# Patient Record
Sex: MALE | Race: WHITE | NOT HISPANIC OR LATINO | URBAN - METROPOLITAN AREA
[De-identification: names, ages, dates, MRNs, and addresses within clinical notes are randomized per-mention and may not be internally consistent; named-entity substitution may affect disease eponyms.]

---
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
## 2022-02-12 VITALS
HEIGHT: 71 IN | DIASTOLIC BLOOD PRESSURE: 70 MMHG | WEIGHT: 192 LBS | WEIGHT: 192 LBS | TEMPERATURE: 97.5 F | HEART RATE: 76 BPM | BODY MASS INDEX: 26.88 KG/M2 | HEIGHT: 71 IN | BODY MASS INDEX: 26.88 KG/M2 | SYSTOLIC BLOOD PRESSURE: 128 MMHG | TEMPERATURE: 97.6 F

## 2022-02-12 VITALS
HEIGHT: 70 IN | WEIGHT: 179 LBS | DIASTOLIC BLOOD PRESSURE: 68 MMHG | SYSTOLIC BLOOD PRESSURE: 118 MMHG | HEART RATE: 76 BPM | TEMPERATURE: 98.2 F | BODY MASS INDEX: 25.62 KG/M2

## 2022-02-12 VITALS
HEART RATE: 76 BPM | WEIGHT: 194.2 LBS | SYSTOLIC BLOOD PRESSURE: 120 MMHG | TEMPERATURE: 97.3 F | DIASTOLIC BLOOD PRESSURE: 80 MMHG

## 2022-02-12 VITALS
HEART RATE: 80 BPM | HEIGHT: 72 IN | SYSTOLIC BLOOD PRESSURE: 132 MMHG | WEIGHT: 225 LBS | BODY MASS INDEX: 30.48 KG/M2 | DIASTOLIC BLOOD PRESSURE: 78 MMHG | TEMPERATURE: 97.8 F

## 2022-02-12 VITALS
BODY MASS INDEX: 29.84 KG/M2 | HEART RATE: 62 BPM | SYSTOLIC BLOOD PRESSURE: 122 MMHG | WEIGHT: 208.4 LBS | HEIGHT: 70 IN | DIASTOLIC BLOOD PRESSURE: 64 MMHG

## 2022-02-12 VITALS
SYSTOLIC BLOOD PRESSURE: 128 MMHG | HEART RATE: 78 BPM | BODY MASS INDEX: 31.94 KG/M2 | TEMPERATURE: 96.5 F | HEIGHT: 72 IN | DIASTOLIC BLOOD PRESSURE: 57 MMHG | WEIGHT: 235.8 LBS

## 2022-02-15 NOTE — NURSING NOTE
Comprehensive Intake Entered On:  12/28/2021 2:06 PM CST    Performed On:  12/28/2021 2:01 PM CST by Josiane COTA, Rowan               Summary   Chief Complaint :   ADHD f/u, refill meds   Weight Measured :   235.8 lb(Converted to: 235 lb 13 oz, 106.957 kg)    Height Measured :   72 in(Converted to: 6 ft 0 in, 182.88 cm)    Body Mass Index :   31.98 kg/m2   Body Surface Area :   2.33 m2   Height/Length Estimated :   69.5 in(Converted to: 5 ft 9 in, 176.53 cm)    Systolic Blood Pressure :   128 mmHg   Diastolic Blood Pressure :   57 mmHg (LOW)    Mean Arterial Pressure :   81 mmHg   Peripheral Pulse Rate :   78 bpm   BP Site :   Right arm   Pulse Site :   Brachial artery   BP Method :   Electronic   HR Method :   Electronic   Temperature Tympanic :   96.5 DegF(Converted to: 35.8 DegC)  (LOW)    Rowan Joshua MA - 12/28/2021 2:01 PM CST   Health Status   Allergies Verified? :   Yes   Medication History Verified? :   Yes   Medical History Verified? :   Yes   Pre-Visit Planning Status :   Completed   Tobacco Use? :   Never smoker   Rowan Joshua MA - 12/28/2021 2:01 PM CST   Consents   Consent for Immunization Exchange :   Consent Granted   Consent for Immunizations to Providers :   Consent Granted   Rowan Joshua MA - 12/28/2021 2:01 PM CST   Meds / Allergies   (As Of: 12/28/2021 2:06:52 PM CST)   Allergies (Active)   No Known Medication Allergies  Estimated Onset Date:   Unspecified ; Created By:   HILL Manzano CMA, Jessica; Reaction Status:   Active ; Category:   Drug ; Substance:   No Known Medication Allergies ; Type:   Allergy ; Updated By:   HILL Manzano CMA, Jessica; Reviewed Date:   10/11/2021 11:35 AM CDT        Medication List   (As Of: 12/28/2021 2:06:52 PM CST)   Prescription/Discharge Order    methylphenidate  :   methylphenidate ; Status:   Prescribed ; Ordered As Mnemonic:   methylphenidate 10 mg oral tablet ; Simple Display Line:   10 mg, 1 tab(s), Oral, tid, for 30 day(s), 90 tab(s), 0 Refill(s) ;  Ordering Provider:   Camilo Singh MD; Catalog Code:   methylphenidate ; Order Dt/Tm:   10/11/2021 12:07:37 PM CDT            Social History   Social History   (As Of: 12/28/2021 2:06:52 PM CST)   Alcohol:        Never   (Last Updated: 4/7/2015 10:31:21 AM CDT by Aníbal Floyd CMA)          Tobacco:        Never (less than 100 in lifetime)   (Last Updated: 10/11/2021 11:34:52 AM CDT by Yue Patel LPN)          Electronic Cigarette/Vaping:        Electronic Cigarette Use: Never.   (Last Updated: 10/11/2021 11:34:55 AM CDT by Yue Patel LPN)          Substance Abuse:        Never, Household substance abuse concerns: No.  Use of drugs by peers: No.   (Last Updated: 4/2/2015 9:09:01 AM CDT by Aníbal Floyd CMA)          Home/Environment:        Lives with Mother, 2 younger brothers.  1 younger sister.  1 other adult relative..  Alcohol abuse in household: No.  Substance abuse in household: No.  Smoker in household: No.  Risks in environment: Firearm in the home; unknown if locked..   (Last Updated: 11/30/2016 7:29:14 AM CST by Charlee Lopez)

## 2022-02-15 NOTE — NURSING NOTE
Comprehensive Intake Entered On:  3/22/2019 11:36 AM CDT    Performed On:  3/22/2019 11:34 AM CDT by Sheyla Robles MA               Summary   Chief Complaint :   Ear consult-Right ear TM perforation found on physical exam, no pain, no drainage, referred by Tayo Musa   Weight Measured :   192 lb(Converted to: 192 lb 0 oz, 87.09 kg)    Height Measured :   71 in(Converted to: 5 ft 11 in, 180.34 cm)    Body Mass Index :   26.78 kg/m2   Body Surface Area :   2.09 m2   Temperature Tympanic :   97.6 DegF(Converted to: 36.4 DegC)  (LOW)    Sheyla Robles MA - 3/22/2019 11:34 AM CDT   Health Status   Allergies Verified? :   Yes   Medication History Verified? :   Yes   Medical History Verified? :   No   Pre-Visit Planning Status :   Completed   Tobacco Use? :   Never smoker   Sheyla Robles MA - 3/22/2019 11:34 AM CDT   Consents   Consent for Immunization Exchange :   Consent Granted   Consent for Immunizations to Providers :   Consent Granted   Sheyla Robles MA - 3/22/2019 11:34 AM CDT   Meds / Allergies   (As Of: 3/22/2019 11:36:58 AM CDT)   Allergies (Active)   No Known Medication Allergies  Estimated Onset Date:   Unspecified ; Created By:   HILL Manzano CMA, Jessica; Reaction Status:   Active ; Category:   Drug ; Substance:   No Known Medication Allergies ; Type:   Allergy ; Updated By:   HILL Manzano CMA, Jessica; Reviewed Date:   3/7/2019 4:15 PM CST        Medication List   (As Of: 3/22/2019 11:36:59 AM CDT)   No Known Home Medications     Sheyla Robles MA - 3/22/2019 11:35:08 AM

## 2022-02-15 NOTE — NURSING NOTE
Comprehensive Intake Entered On:  10/11/2021 11:37 AM CDT    Performed On:  10/11/2021 11:31 AM CDT by Yue Patel LPN               Summary   Chief Complaint :   having trouble concentrating in school, would like to discuss treatment for anxiety and ADHD   Weight Measured :   225 lb(Converted to: 225 lb 0 oz, 102.058 kg)    Height Measured :   72 in(Converted to: 6 ft 0 in, 182.88 cm)    Body Mass Index :   30.51 kg/m2   Body Surface Area :   2.27 m2   Height/Length Estimated :   69.5 in(Converted to: 5 ft 9 in, 176.53 cm)    Systolic Blood Pressure :   132 mmHg (HI)    Diastolic Blood Pressure :   78 mmHg   Mean Arterial Pressure :   96 mmHg   Peripheral Pulse Rate :   80 bpm   BP Site :   Right arm   Pulse Site :   Radial artery   BP Method :   Manual   HR Method :   Manual   Temperature Tympanic :   97.8 DegF(Converted to: 36.6 DegC)  (LOW)    Yue Patel LPN - 10/11/2021 11:31 AM CDT   Health Status   Allergies Verified? :   Yes   Medication History Verified? :   Yes   Pre-Visit Planning Status :   Completed   Tobacco Use? :   Never smoker   Yue Patel LPN - 10/11/2021 11:31 AM CDT   Consents   Consent for Immunization Exchange :   Consent Granted   Consent for Immunizations to Providers :   Consent Granted   Yue Patel LPN - 10/11/2021 11:31 AM CDT   Meds / Allergies   (As Of: 10/11/2021 11:37:08 AM CDT)   Allergies (Active)   No Known Medication Allergies  Estimated Onset Date:   Unspecified ; Created By:   HILL Manzano CMA, Jessica; Reaction Status:   Active ; Category:   Drug ; Substance:   No Known Medication Allergies ; Type:   Allergy ; Updated By:   HILL Manzano CMA, Jessica; Reviewed Date:   10/11/2021 11:35 AM CDT        Medication List   (As Of: 10/11/2021 11:37:08 AM CDT)        Social History   Social History   (As Of: 10/11/2021 11:37:08 AM CDT)   Alcohol:        Never   (Last Updated: 4/7/2015 10:31:21 AM CDT by Aníbal Floyd CMA)          Tobacco:        Never (less  than 100 in lifetime)   (Last Updated: 10/11/2021 11:34:52 AM CDT by Yue Patel LPN)          Electronic Cigarette/Vaping:        Electronic Cigarette Use: Never.   (Last Updated: 10/11/2021 11:34:55 AM CDT by Yue Patel LPN)          Substance Abuse:        Never, Household substance abuse concerns: No.  Use of drugs by peers: No.   (Last Updated: 4/2/2015 9:09:01 AM CDT by Aníbal Floyd CMA)          Home/Environment:        Lives with Mother, 2 younger brothers.  1 younger sister.  1 other adult relative..  Alcohol abuse in household: No.  Substance abuse in household: No.  Smoker in household: No.  Risks in environment: Firearm in the home; unknown if locked..   (Last Updated: 11/30/2016 7:29:14 AM CST by Charlee Lopez)

## 2022-02-15 NOTE — PROGRESS NOTES
Chief Complaint    ADHD f/u, refill meds  History of Present Illness      Patent presents for attention deficit disorder follow up.  There have been no problems with the medication. The current dose is controlling symptoms. There are no other concerns.      He did much better in school last semester.      He will be transferring to [employer] for the fall semester 2022  Review of Systems          ROS reviewed and negative except for symptoms noted in HPI.  Physical Exam   Vitals & Measurements    T: 96.5  F (Tympanic)  HR: 78 (Peripheral)  BP: 128/57     HT: 72 in  HT: 69.5 in  WT: 235.8 lb  BMI: 31.98             General:  Alert and oriented, No acute distress.             Eye: Normal conjunctiva.             HENT:  Oral mucosa is moist.             Neck:  Supple.             Respiratory:  Respirations are non-labored.             Cardiovascular:  Normal rate           Musculoskeletal:  Normal gait.             Psychiatric:  Cooperative, Appropriate mood & affect, Normal judgment.  Assessment/Plan       1. ADD (attention deficit disorder) (F98.8)         Doing well, continue current medication        follow up in 4 months        He will send a message when ready for refill                Orders:         methylphenidate, = 1 tab(s) ( 10 mg ), Oral, bid, # 60 tab(s), 0 Refill(s), Type: Maintenance, Pharmacy: [facility] #[id number], 1 tab(s) Oral bid,x30 day(s), 72, in, 12/28/21 14:01:00 CST, Height Measured, 235.8, lb, 12/28/21 14:01:00 CST, Weight Measured, (Ordered)         methylphenidate, = 1 tab(s) ( 10 mg ), Oral, tid, x 30 day(s), # 90 tab(s), 0 Refill(s), Type: Hard Stop, Pharmacy: [facility] #[id number], 72, in, 10/11/21 11:31:00 CDT, Height Measured, 225, lb, 10/11/21 11:31:00 CDT, Weight Measured, (Completed)  Patient Information     Name:BRUNNER, JAMES A      Address:      [address] [place] [phone]     Sex:Male     YOB: 2000     [phone]     Emergency  Contact:BRUNNER, LIJANE H     MRN:[id number]     FIN:[id number]     Location:[facility]     Date of Service:12/28/2021      Primary Care Physician:       NONE ,       Attending Physician:       Camilo Singh MD, [phone]  Problem List/Past Medical History    Ongoing     Obesity     Warts       Comments: Left hand. Left foot. Treated. See outside records.    Historical     No qualifying data  Procedure/Surgical History     None  Medications    methylphenidate 10 mg oral tablet, 10 mg= 1 tab(s), Oral, bid  Allergies    No Known Medication Allergies  Social History    Smoking Status     Never smoker     Alcohol      Never     Electronic Cigarette/Vaping      Electronic Cigarette Use: Never.     Home/Environment      Lives with Mother, 2 younger brothers. 1 younger sister. 1 other adult relative.. Alcohol abuse in household: No. Substance abuse in household: No. Smoker in household: No. Risks in environment: Firearm in the home; unknown if locked..     Substance Abuse      Never, Household substance abuse concerns: No. Use of drugs by peers: No.     Tobacco      Never (less than 100 in lifetime)  Family History    Alcohol abuse: Grandfather (M) and Grandfather (P).    Asthma: Father.    Diabetes mellitus: Grandfather (M).    Graves' disease: Mother.    Heart attack: Grandfather (P).    Heart disease: Grandfather (P).    Hypertension: Grandfather (P).    Tobacco abuse: Grandfather (M) and Grandfather (P).    Sister: History is unknown    Brother: History is unknown    Brother: History is unknown  Immunizations       Scheduled Immunizations       Dose Date(s)       DTaP       2000, 2002, 2002, 07/16/2003, 05/17/2007       Hep A, pediatric/adolescent       05/17/2007, 06/26/2008       Hep B       08/18/2011, 10/26/2011       hepatitis B pediatric vaccine       2002, 2002, 2002       Hib (HbOC)       2002, 2002, 03/16/2003, 2002       human papillomavirus  vaccine       02/02/2016, 04/08/2016, 08/15/2016       influenza       10/14/2014, 10/26/2011       influenza virus vaccine, inactivated       10/29/2015, 10/16/2018       IPV       2002, 2002, 08/16/2003, 03/17/2007       meningococcal conjugate vaccine       06/19/2014       MMR (measles/mumps/rubella)       04/09/2003, 01/15/2003, 06/13/2003, 05/17/2007       pneumococcal (PCV7)       05/01/2003, 09/16/2003, 10/01/2003, 12/02/2003       SARS-CoV-2 (COVID-19) Aubrey Ad26 vacc       05/07/2021       tetanus/diphth/pertuss (Tdap) adult/adol       06/19/2014       varicella       03/17/2007, 08/22/2007       Other Immunizations               influenza virus vaccine, inactivated       10/11/2021

## 2022-02-15 NOTE — PROGRESS NOTES
Patient:   BRUNNER, JAMES A            MRN: [id number]            FIN: [id number]               Age:   18 years     Sex:  Male     :  2000   Associated Diagnoses:   Sports physical; Tympanic membrane perforation; Well child check   Author:   Jazmine Witt      Visit Information      Date of Service: 2019 03:29 pm  Performing Location: [facility]  Encounter#: [id number]      Primary Care Provider (PCP):  Jazmine Witt    NPI# [id number]      Chief Complaint   3/7/2019 3:35 PM CST     Sports px - tennis.        Well Adult History   PPC for sports physical, will be playing tennis,   knows how to swim, rides bike with helmet  no guns in house,  knows how to drive and does not text and drive  is dating now but denies sexual activity  no hx of smoking, asthma, recent international travel  no illegal drug use  sees eye doctor annually and dentist at least once a year      Review of Systems   Constitutional:  Negative.    Eye:  Negative.    Ear/Nose/Mouth/Throat:  Negative.    Respiratory:  Negative.    Cardiovascular:  Negative, reports 'innocent murmur' as child.    Gastrointestinal:  Negative.    Genitourinary:  Negative.    Hematology/Lymphatics:  Negative.    Endocrine:  Negative.    Immunologic:  Negative.    Musculoskeletal:  Negative, no marfan's hx.    Integumentary:  Negative.    Neurologic:  Negative, no concussion hx.    Psychiatric:  Negative.       Health Status   Allergies:    Allergic Reactions (Selected)  No Known Medication Allergies   Problem list:    All Problems (Selected)  Obesity / SNOMED CT [id number] / Probable      Histories   Family History:    Graves' disease  Mother (LiJane)  Diabetes mellitus  Grandfather (M)  Hypertension  Grandfather (P)  Heart disease  Grandfather (P)  Alcohol abuse  Grandfather (P)  Grandfather (M)  Heart attack  Grandfather (P)  Tobacco abuse  Grandfather (M)  Grandfather (P)  Asthma  Father     Procedure history:    None  ([id number]).   Social History:        Alcohol Assessment            Never      Tobacco Assessment            Never      Substance Abuse Assessment            Never, Household substance abuse concerns: No.  Use of drugs by peers: No.      Home and Environment Assessment            Lives with Mother, 2 younger brothers.  1 younger sister.  1 other adult relative..  Alcohol abuse in               household: No.  Substance abuse in household: No.  Smoker in household: No.  Risks in environment: Firearm in               the home; unknown if locked..      Physical Examination   Vital Signs   3/7/2019 3:35 PM CST Temperature Tympanic 97.5 DegF  LOW    Peripheral Pulse Rate 76 bpm    Pulse Site Radial artery    HR Method Manual    Systolic Blood Pressure 128 mmHg    Diastolic Blood Pressure 70 mmHg    Mean Arterial Pressure 89 mmHg    BP Site Right arm    BP Method Manual      Measurements from flowsheet : Measurements   3/7/2019 3:35 PM CST Height Measured - Standard 71 in    Weight Measured - Standard 192 lb    BSA 2.09 m2    Body Mass Index 26.78 kg/m2    Body Mass Index Percentile 92.40      General:  Alert and oriented, No acute distress.    Eye:  Pupils are equal, round and reactive to light, Intact accommodation, Normal conjunctiva, Vision unchanged.         Periorbital area: Within normal limits.    HENT:  Normocephalic, Normal hearing, Oral mucosa is moist, No pharyngeal erythema, No sinus tenderness, left TM normal  right TM perforation, .    Neck:  Supple, Non-tender, No lymphadenopathy, No thyromegaly.    Respiratory:  Lungs are clear to auscultation, Respirations are non-labored, No chest wall tenderness.    Cardiovascular:  Normal rate, Regular rhythm, No murmur, No edema.    Gastrointestinal:  Soft, Non-tender, Non-distended, Normal bowel sounds, No organomegaly.    Genitourinary:  No costovertebral angle tenderness, Normal genitalia for age and sex, No scrotal tenderness, No inguinal tenderness.     Lymphatics:  No lymphadenopathy neck, axilla, groin.    Musculoskeletal:  Normal range of motion, Normal strength, No swelling.    Integumentary:  Warm, Dry, Pink.    Neurologic:  Alert, Oriented, Normal sensory, Normal motor function.    Psychiatric:  Cooperative, Appropriate mood & affect, Normal judgment.       Impression and Plan   Diagnosis     Sports physical (EZW41-IJ Z02.5).     Tympanic membrane perforation (UWF73-JZ H72.90).     Well child check (BEB81-YK Z00.129).     Plan:  called mother with found perforation on exam, no ear pain, no discharge  will refer to ENT for evaluation    cleared for tennis.

## 2022-02-15 NOTE — PROGRESS NOTES
Patient:   BRUNNER, JAMES A            MRN: [id number]            FIN: [id number]               Age:   18 years     Sex:  Male     :  2000   Associated Diagnoses:   Pinworms   Author:   Bradly Stoner MD      Chief Complaint   10/15/2018 7:28 PM CDT   recurring pinworms. update flu shot      History of Present Illness   see chief complaint as noted above and confirmed with the patient   18 year old male here with his mom presenting with reaccurring pinworms. He has been taking OTC pin worm treatment but hasn't cleared them up. He has noticed he has small white worms in his stool. He does get some rectal itching at night.      Review of Systems   Constitutional:  No fever, No chills.    Ear/Nose/Mouth/Throat:  Negative.    Respiratory:  No shortness of breath.    Cardiovascular:  No chest pain.    Gastrointestinal:  white worms in stool, No nausea, No vomiting.    Musculoskeletal:  No back pain.    Integumentary:  No rash.    Neurologic:  No headache.    Psychiatric:  Negative.              Health Status   Allergies:    Allergic Reactions (Selected)  No Known Medication Allergies   Medications:  (Selected)   Prescriptions  Prescribed  Bactroban 2% topical ointment: 1 eve, TOP, TID, # 30 g, 1 Refill(s), Type: Maintenance, Pharmacy: [facility] #[id number], please omit previous RX for bactroban CREAM, 1 eve top tid   Problem list:    All Problems  Inactive: Warts      Histories   Past Medical History:    No active or resolved past medical history items have been selected or recorded.   Family History:    Graves' disease  Mother (LiJane)  Diabetes mellitus  Grandfather (M)  Hypertension  Grandfather (P)  Heart disease  Grandfather (P)  Alcohol abuse  Grandfather (P)  Grandfather (M)  Heart attack  Grandfather (P)  Tobacco abuse  Grandfather (M)  Grandfather (P)  Asthma  Father     Procedure history:    None ([id number]).   Social History:        Alcohol Assessment            Never      Tobacco Assessment             Never      Substance Abuse Assessment            Never, Household substance abuse concerns: No.  Use of drugs by peers: No.      Home and Environment Assessment            Lives with Mother, 2 younger brothers.  1 younger sister.  1 other adult relative..  Alcohol abuse in               household: No.  Substance abuse in household: No.  Smoker in household: No.  Risks in environment: Firearm in               the home; unknown if locked..        Physical Examination   Vital Signs   10/15/2018 7:28 PM CDT Peripheral Pulse Rate 62 bpm    Systolic Blood Pressure 122 mmHg    Diastolic Blood Pressure 64 mmHg    Mean Arterial Pressure 83 mmHg      Measurements from flowsheet : Measurements   10/15/2018 7:28 PM CDT Height Measured - Standard 69.5 in    Weight Measured - Standard 208.4 lb    BSA 2.15 m2    Body Mass Index 30.33 kg/m2    Body Mass Index Percentile 97.61      General:  Alert and oriented, No acute distress.    Eye:  Pupils are equal, round and reactive to light, Normal conjunctiva.    HENT:  Oral mucosa is moist.    Neck:  Supple.    Respiratory:  Respirations are non-labored.    Cardiovascular:  Normal rate, Regular rhythm, No edema.    Gastrointestinal:  Non-distended.    Musculoskeletal:  Normal gait.    Integumentary:  Warm, No rash.    Psychiatric:  Cooperative, Appropriate mood & affect, Normal judgment.       Review / Management   Results review      Impression and Plan   Diagnosis     Pinworms ([id number]).     Course:  called pharmacy due to cost of medication and they will investigate with insurance.    Plan:  [facility], acted solely as a scribe for, and in the presence of Dr. Bradly Stoner who performed the service..

## 2022-02-15 NOTE — TELEPHONE ENCOUNTER
---------------------  From: Maria L/Loretta COTA (Phone Messages Pool ([id number]_[facility]))   Sent: 9/14/2021 5:15:28 PM CDT  Subject: General Message     Phone Message    PCP: n/a    Time of Call: 1712    Phone Number: n/a    Returned call at: n/a    Note: mom calls stating that pt is away at school and is having a hard time at school. Patient tells mom that he is struggling w/ brain fog and anxiety. Mom states that providers in [place] where he goes to school have not been able to help him. Mom wondering if there is a doctor here that would be able to help pt get a dx of adhd if he has it as his other 2 siblings have it.    Spoke to [facility] CSS, they states to schedule a ADHD consult with [facility] 40min.    Mom agreed and was transferred to scheduling

## 2022-02-15 NOTE — PROGRESS NOTES
Chief Complaint    having trouble concentrating in school, would like to discuss treatment for anxiety and ADHD  History of Present Illness      Patient is here with his mother to discuss attention deficit disorder and possible treatment.  He is currently having trouble at school.  He is on academic probation and is in danger of feeling his classes.  He has some difficulty with attention issues while in high school.  He was able to work his way through this and did fairly well.  Since he has been at school and on his own this is been much more difficult.  His 2 siblings are treated for attention deficit disorder.  He also reports mild anxiety and some depressive symptoms which are related to his current difficulties in school.      Inattention      Fails to pay attention to details:  yes      Difficulty sustaining attention:  yes      Difficulty listening:  yes      Trouble following through on tasks:  yes      Difficulty with organization;  yes      Avoids tasks that require prolonged mental effort:  yes      Loses important items:  yes      Easily distracted:  yes      Forgetful:  yes      Hyperactivity and impulsivity      Fidgets and squirms:  yes      Trouble with remaining seated:  no      Restless and runs about:  no      Unable to engage in activities quietly:  yes      Predominately on the go:  no      [place] talkative:  yes      Talks over others in conversation:  no      Trouble with patience or waiting turn:  no      Intrusive on activities of others:  no         Review of Systems          ROS reviewed and negative except for symptoms noted in HPI.  Physical Exam   Vitals & Measurements    T: 97.8  F (Tympanic)  HR: 80 (Peripheral)  BP: 132/78     HT: 72 in  HT: 69.5 in  WT: 225 lb  BMI: 30.51             General:  Alert and oriented, No acute distress.             Eye: Normal conjunctiva.             HENT:  Oral mucosa is moist.             Neck:  Supple.             Respiratory:  Respirations are  non-labored.             Cardiovascular:  Normal rate           Musculoskeletal:  Normal gait.             Psychiatric:  Cooperative, Appropriate mood & affect, Normal judgment.  Assessment/Plan       1. ADD (attention deficit disorder) without hyperactivity (F98.8)          Patient meets criteria for attention deficit disorder.  I suspect his anxiety and depressive symptoms are related to his academic difficulties.  Family history is positive for attention deficit disorder making it more likely that he has a diagnosis as well.         We have discussed stimulant therapy and its mechanism of action.  We have also discussed the desired effects the medication as well as the controlled nature of the medication.         I have encouraged him to follow-up with the counseling at school.  Methylphenidate 10 mg twice daily increasing to 3 times daily as needed has been prescribed.  He will sign up for the portal and notify us of this progress over the next couple of weeks.  He will be back here on Thanksgiving break at which time he will have a follow-up visit to assess the effectiveness of the medication.  Side effects, expected course discussed       Orders:         influenza virus vaccine, inactivated, 0.5 mL, IM, once, (Completed)         methylphenidate, = 1 tab(s) ( 10 mg ), Oral, tid, # 90 tab(s), 0 Refill(s), Type: Maintenance, Pharmacy: [facility] #[id number], 1 tab(s) Oral tid,x30 day(s), 72, in, 10/11/21 11:31:00 CDT, Height Measured, 225, lb, 10/11/21 11:31:00 CDT, Weight Measured, (Ordered)         19986 iiv4 vacc no prsv 0.5 ml im (Charge), Quantity: 1, Encounter for immunization  Patient Information     Name:BRUNNERTENISHA      Address:      [address] [place] [id number]     Sex:Male     YOB: 2000     [phone]     Emergency Contact:BRUNNER, LIJANE H     MRN:[id number]     FIN:[id number]     Location:[facility]     Date of Service:10/11/2021      Primary Care  Physician:       NONE ,       Attending Physician:       Camilo Singh MD, [phone]  Problem List/Past Medical History    Ongoing     Obesity     Warts       Comments: Left hand. Left foot. Treated. See outside records.    Historical     No qualifying data  Procedure/Surgical History     None  Medications    methylphenidate 10 mg oral tablet, 10 mg= 1 tab(s), Oral, tid  Allergies    No Known Medication Allergies  Social History    Smoking Status     Never smoker     Alcohol      Never     Electronic Cigarette/Vaping      Electronic Cigarette Use: Never.     Home/Environment      Lives with Mother, 2 younger brothers. 1 younger sister. 1 other adult relative.. Alcohol abuse in household: No. Substance abuse in household: No. Smoker in household: No. Risks in environment: Firearm in the home; unknown if locked..     Substance Abuse      Never, Household substance abuse concerns: No. Use of drugs by peers: No.     Tobacco      Never (less than 100 in lifetime)  Family History    Alcohol abuse: Grandfather (M) and Grandfather (P).    Asthma: Father.    Diabetes mellitus: Grandfather (M).    Graves' disease: Mother.    Heart attack: Grandfather (P).    Heart disease: Grandfather (P).    Hypertension: Grandfather (P).    Tobacco abuse: Grandfather (M) and Grandfather (P).    Sister: History is unknown    Brother: History is unknown    Brother: History is unknown  Immunizations          Scheduled Immunizations          Dose Date(s)          DTaP          2000, 2000, 2000, 07/16/2003, 05/17/2007          Hep A, pediatric/adolescent          05/17/2007, 06/26/2008          Hep B          08/18/2011, 10/26/2011          hepatitis B pediatric vaccine          2002, 2002, 2002          Hib (HbOC)          2002, 2002, 03/16/2003, 2002          human papillomavirus vaccine          02/02/2016, 04/08/2016, 08/15/2016          influenza          10/14/2014,  10/26/2011          influenza virus vaccine, inactivated          10/29/2015, 10/16/2018          IPV          2002, 2002, 08/16/2003, 03/17/2007          meningococcal conjugate vaccine          06/19/2014          MMR (measles/mumps/rubella)          04/09/2003, 01/15/2003, 06/13/2003, 05/17/2007          pneumococcal (PCV7)          05/01/2003, 09/16/2003, 10/01/2003, 12/02/2003          SARS-CoV-2 (COVID-19) Aubrey Ad26 vacc          05/07/2021          tetanus/diphth/pertuss (Tdap) adult/adol          06/19/2014          varicella          03/17/2007, 08/22/2007          Other Immunizations          influenza virus vaccine, inactivated          10/11/2021

## 2022-02-15 NOTE — TELEPHONE ENCOUNTER
---------------------  From: Indira Beth RN (Phone Messages Pool ([id number]_[facility]))   To: Appointment Pool ([id number]_[place]); BRUNNER, JAMES A    Sent: 12/20/2021 1:45:34 PM CST  Subject: Consumer message: FW: James Brunner prescription medication refill     Desiree, Tenisha,   We are glad to hear the Methylphenidate was helpful for you. You will need to have a follow up visit with Dr. Singh for further refills. You can do an in clinic visit or video telehealth visit.   Your message is being forwarded to scheduling and they will assist you in making an appointment.    Thank you,  Indira LANZA RN      ---------------------  From: JAMES BRUNNER  To: [facility]  Sent: 12/20/2021 01:19 p.m. CST  Subject: James Brunner prescription medication refill  Hello, after the last few months I have finished my semester in college and my prescription helped immensely. Is there any way I can renew the prescription for Methylphenidate? If you have any questions or need me to come in then Id be happy to help.LM X 1 PT TO CALL BACK TO SCHEDULE

## 2022-02-15 NOTE — PROGRESS NOTES
Chief Complaint    PT having skin problems on right side of face.  History of Present Illness      Chief complaint as above reviewed and confirmed with patient and mom.  Pt presents to the clinic with concerns re: rash on the face.  sx started in the last 2 days.  first started as an erythematous papule, he scratched it and it drained some honey colored/clear yellow discharge.  has noted progressive spreading of the rash with more spots appearing today.  it is somewhat itchy but not painful.  no fevers. no nausea, vomiting.  no hx of similar rash.   Review of Systems      Review of systems is negative with the exception of those noted in HPI          Physical Exam   Vitals & Measurements    T: 97.3(Tympanic)  HR: 76(Peripheral)  BP: 120/80     HT: 69.5 in  WT: 194.2 lb       exam of the face reveals multiple shallow ulcerations R face at the jawline and R cheek, R chin.  there is mild honey colored crusting and a few erythematous papules present.  no pustules. no surrounding erythema.  The ulcerations are about 5 mm to 1.5 cm in size with slightly irregular boarders.  Assessment/Plan       1. Impetigo         bactrobain ointment as ordered.  PI given and discussed.  avoidance of spreading.  recommend 24 hours prior to return to school.  happy to recheck for rapidly worsening sx or signs of associated cellulitis.  mom and child agreeable.       Orders:         mupirocin topical, 1 eve, TOP, TID, # 30 g, 1 Refill(s), Type: Maintenance, Pharmacy: [facility] #[id number], please omit previous RX for bactroban CREAM, 1 eve top tid  Patient Information     Name:BRUNNER, JAMES A      Address:      [address] [place] [id number]     Sex:Male     YOB: 2000     [phone]     MRN:[id number]     FIN:[id number]     Location:[facility]     Date of Service:03/18/2018      Primary Care Physician:       Jazmine Witt, [phone]  Problem List/Past Medical History     Ongoing     Warts      Comments: Left hand. Left foot. Treated. See outside records.    Historical  Procedure/Surgical History     None  Medications     Bactroban 2% topical ointment: 1 eve, TOP, TID, 30 g, 1 Refill(s).      Allergies    No Known Medication Allergies  Social History    Smoking Status - 03/18/2018     Never smoker     Alcohol - 11/30/2016      Never     Home and Environment - 11/30/2016      Lives with Mother, 2 younger brothers. 1 younger sister. 1 other adult relative.. Alcohol abuse in household: No. Substance abuse in household: No. Smoker in household: No. Risks in environment: Firearm in the home; unknown if locked..     Substance Abuse - 11/30/2016      Never, Household substance abuse concerns: No. Use of drugs by peers: No.     Tobacco - 11/30/2016      Never  Family History    Alcohol abuse: Grandfather (M) and Grandfather (P).    Asthma: Father.    Diabetes mellitus: Grandfather (M).    Graves' disease: Mother.    Heart attack: Grandfather (P).    Heart disease: Grandfather (P).    Hypertension: Grandfather (P).    Tobacco abuse: Grandfather (M) and Grandfather (P).  Immunizations      Vaccine Date Status      human papillomavirus vaccine 08/15/2016 Given      human papillomavirus vaccine 04/08/2016 Given      human papillomavirus vaccine 02/02/2016 Given      influenza virus vaccine, inactivated 10/29/2015 Recorded      influenza 10/14/2014 Recorded      meningococcal conjugate vaccine 06/19/2014 Recorded      tetanus/diphth/pertuss (Tdap) adult/adol 06/19/2014 Recorded      Hep B 10/26/2011 Recorded      influenza 10/26/2011 Recorded      Hep B 08/18/2011 Recorded      Hep A, pediatric/adolescent 06/26/2008 Recorded      varicella 08/22/2007 Recorded      MMR (measles/mumps/rubella) 05/17/2007 Recorded      Hep A, pediatric/adolescent 05/17/2007 Recorded      DTaP 05/17/2007 Recorded      IPV 03/17/2007 Recorded      varicella 03/17/2007 Recorded      pneumococcal (PCV7) 12/02/2003  Recorded      pneumococcal (PCV7) 10/01/2003 Recorded      pneumococcal (PCV7) 09/16/2003 Recorded      IPV 08/16/2003 Recorded      DTaP 07/16/2003 Recorded      MMR (measles/mumps/rubella) 06/13/2003 Recorded      pneumococcal (PCV7) 05/01/2003 Recorded      MMR (measles/mumps/rubella) 04/09/2003 Recorded      Hib (HbOC) 03/16/2003 Recorded      MMR (measles/mumps/rubella) 01/15/2003 Recorded      hepatitis B pediatric vaccine 2002 Recorded      Hib (HbOC) 2002 Recorded      DTaP 2002 Recorded      hepatitis B pediatric vaccine 2002 Recorded      IPV 2002 Recorded      Hib (HbOC) 2002 Recorded      DTaP 2002 Recorded      IPV 2002 Recorded      Hib (HbOC) 2002 Recorded      hepatitis B pediatric vaccine 2002 Recorded      DTaP 2002 Recorded  Lab Results   Results (Last 90 days)   No results located.

## 2022-02-15 NOTE — PROGRESS NOTES
Patient:   BRUNNER, JAMES A            MRN: [id number]            FIN: [id number]               Age:   18 years     Sex:  Male     :  2000   Associated Diagnoses:   None   Author:   Ede Monterroso MD      Visit Information      Date of Service: 2019 11:32 am  Performing Location: [facility]  Encounter#: [id number]      Referring Provider:  Ede Monterroso MD    NPI# [id number]      Chief Complaint   3/22/2019 11:34 AM CDT   Ear consult-Right ear TM perforation found on physical exam, no pain, no drainage, referred by Tayo Musa        History of Present Illness   Asked to see by Dimple for evaluation of right TM perforation. Mom reports that the patient was found to have a perforation of the right TM during a routine sports physical. Mom and patient deny history of ear infections or ear surgery. No drainage. No pain. No vertigo.       Review of Systems   Constitutional:  Negative.    Ear/Nose/Mouth/Throat:  Negative except as documented in history of present illness.    Respiratory:  Negative.       Health Status   Allergies:    Allergic Reactions (Selected)  No Known Medication Allergies   Problem list:    All Problems (Selected)  Obesity / SNOMED CT [id number] / Probable      Histories   Family History:    Graves' disease  Mother (LiJane)  Diabetes mellitus  Grandfather (M)  Hypertension  Grandfather (P)  Heart disease  Grandfather (P)  Alcohol abuse  Grandfather (P)  Grandfather (M)  Heart attack  Grandfather (P)  Tobacco abuse  Grandfather (M)  Grandfather (P)  Asthma  Father     Procedure history:    None ([id number]).      Physical Examination   Vital Signs   3/22/2019 11:34 AM CDT   Temperature Tympanic      97.6 DegF  LOW     Measurements from flowsheet : Measurements   3/22/2019 11:34 AM CDT Height Measured - Standard 71 in    Weight Measured - Standard 192 lb    BSA 2.09 m2    Body Mass Index 26.78 kg/m2    Body Mass Index Percentile 92.40      CONSTITUTIONAL  General Appearance:  Normal,  well developed, well nourished, no obvious distress  Ability to Communicate:  communicates appropriately.    HEAD AND FACE  Appearance and Symmetry:  Normal, no scalp or facial scarring or suspicious lesions.  Paranasal sinuses tenderness:  Normal, Paranasal sinuses non tender    EARS  Clinical speech reception threshold:  Normal, able to hear normal speech.  Auricle:  Normal, Auricles without scars, lesions, masses.  External auditory canal:  Normal, External auditory canal normal.  Tympanic membrane:  Monomeric segment of the right TM that looks like a perforation.  Tympanic membrane mobility:  Normal, Normal tympanic membrane mobility.    NOSE (speculum or scope)  Architecture:  Normal, Grossly normal external nasal architecture with no masses or lesions.  Mucosa:  Normal mucosa, No polyps or masses.  Septum:  Normal, Septum non-obstructing.  Turbinates:  Normal, No turbinate abnormalities    ORAL CAVITY AND OROPHARYNX  Lips:  Normal.  Dental and gingiva:  Normal, No obvious dental or gingival disease.  Mucosa:  Normal, Moist mucous membranes.  Tongue:  Normal, Tongue mobile with no mucosal abnormalities  Hard and soft palate:  Normal, Hard and soft palate without cleft or mucosal lesions.  Oral pharynx:  Normal, Posterior pharynx without lesions or remarkable asymmetry.  Saliva:  Normal, Clear saliva.  Masses:  Normal, No palpable masses or pathologically enlarged lymph nodes.    NECK  Masses/lymph nodes:  Normal, No worrisome neck masses or lymph nodes.  Salivary glands:  Normal, Parotid and submandibular glands.  Trachea and larynx position:  Normal, Trachea and larynx midline.  Thyroid:  Normal, No thyroid abnormality.  Tenderness:  Normal, No cervical tenderness.  Suppleness:  Normal, Neck supple    NEUROLOGICAL  Speech pattern:  Normal, Proasaic    RESPIRATORY  Symmetry and Respiratory effort:  Normal, Symmetric chest movement and expansion with no increased intercostal retractions or use of accessory  muscles.       Impression and Plan   Small area of the TM that looks like a perforation. However I was able to identify movement on pneumatic otoscopy. I provided reassurance to the patient and his mom regarding the findings. Follow up as needed.

## 2022-02-15 NOTE — NURSING NOTE
Depression Screening Entered On:  12/10/2021 8:57 AM CST    Performed On:  10/11/2021 8:56 AM CDT by Noemy Rivera               Depression Screening   Little Interest - Pleasure in Activities :   Not at all   Feeling Down, Depressed, Hopeless :   Not at all   Initial Depression Screen Score :   0 Score   Poor Appetite or Overeating :   More than half the days   Trouble Falling or Staying Asleep :   Nearly every day   Feeling Tired or Little Energy :   Several days   Feeling Bad About Yourself :   More than half the days   Trouble Concentrating :   Nearly every day   Moving or Speaking Slowly :   Several days   Thoughts Better Off Dead or Hurting Self :   Not at all   Difficulty at Work, Home, Getting Along :   Very difficult   Detailed Depression Screen Score :   12    Total Depression Screen Score :   12    Noemy Rivera - 12/10/2021 8:56 AM CST

## 2022-02-15 NOTE — PROGRESS NOTES
Patient:   BRUNNER, JAMES A            MRN: [id number]            FIN: [id number]               Age:   17 years     Sex:  Male     :  2000   Associated Diagnoses:   Well child check   Author:   Jazmine Witt      Chief Complaint   2017 4:32 PM CDT     17 year well child. No concerns.        Well Child History   PPC with mother and brothers for well child check and sports physical, plays baseball  will be sophomore in school, wears glasses, sees eye doctor annually  sees dentist annually, no concerns from mother  with mother out of room child denies sexual activity      Review of Systems   Constitutional:  Negative.    Eye:  Negative.    Ear/Nose/Mouth/Throat:  Negative.    Respiratory:  Negative.    Cardiovascular:  Negative, pt and mother deny sudden cardiac death or drowning hx in family.    Gastrointestinal:  Negative.    Genitourinary:  Negative.    Hematology/Lymphatics:  Negative, no mono hx.    Endocrine:  Negative.    Immunologic:  Negative.    Musculoskeletal:  Negative.    Integumentary:  Negative.    Neurologic:  Negative.    Psychiatric:  Negative.             Health Status   Allergies:    Allergic Reactions (Selected)  No Known Medication Allergies      Histories   Family History:    Graves' disease  Mother (LiJane)  Diabetes mellitus  Grandfather (M)  Hypertension  Grandfather (P)  Heart disease  Grandfather (P)  Alcohol abuse  Grandfather (P)  Grandfather (M)  Heart attack  Grandfather (P)  Tobacco abuse  Grandfather (M)  Grandfather (P)  Asthma  Father     Procedure history:    None ([id number]).      Physical Examination   Vital Signs   2017 4:32 PM CDT Temperature Tympanic 98.2 DegF    Peripheral Pulse Rate 76 bpm    Pulse Site Radial artery    HR Method Manual    Systolic Blood Pressure 118 mmHg    Diastolic Blood Pressure 68 mmHg    Mean Arterial Pressure 85 mmHg    BP Site Right arm    BP Method Manual      Measurements from flowsheet : Measurements   2017 4:32 PM CDT  Height Measured - Standard 69.5 in    Weight Measured - Standard 179 lb    BSA 1.99 m2    Body Mass Index 26.05 kg/m2    Body Mass Index Percentile 93.64      General:  Alert and oriented, No acute distress.    Eye:  Pupils are equal, round and reactive to light, Intact accommodation, Normal conjunctiva, Vision unchanged.         Periorbital area: Within normal limits.    HENT:  Normocephalic, Tympanic membranes are clear, Normal hearing, Oral mucosa is moist, No pharyngeal erythema, No sinus tenderness.    Neck:  Supple, Non-tender, No lymphadenopathy, No thyromegaly.    Respiratory:  Lungs are clear to auscultation, Respirations are non-labored, No chest wall tenderness.    Cardiovascular:  Normal rate, Regular rhythm, No murmur, No edema.    Gastrointestinal:  Soft, Non-tender, Non-distended, Normal bowel sounds, No organomegaly.    Genitourinary:  No costovertebral angle tenderness, Normal genitalia for age and sex, No scrotal tenderness, No inguinal tenderness, No urethral discharge, uncircumcised.    Lymphatics:  No lymphadenopathy neck, axilla, groin.    Musculoskeletal:  Normal range of motion, Normal strength, No swelling.    Integumentary:  Warm, Dry, Pink.    Neurologic:  Alert, Oriented.    Psychiatric:  Cooperative, Appropriate mood & affect.       Health Maintenance      Recommendations     Pending (in the next year)     There are no current recommendations pending        Due In Future            Well Child 2 yrs - 18 yrs not due until  08/15/17  and every 1  year(s)     Satisfied (in the past 1 year)        Satisfied            Body Mass Index Check (Male) on  06/01/17.           Body Mass Index Check (Male) on  08/15/16.           Well Child 2 yrs - 18 yrs on  08/15/16.        Impression and Plan   Diagnosis     Well child check ([id number] Z00.129).     Patient Instructions:       Counseled: Patient, Family, Regarding diagnosis, Diet, Activity, Verbalized understanding.    Summary:  no healthcare  concerns today, discussed testicular cancer s+s and exam  discussed texting while driving as he will be getting learner's permit this yr  discussed STD communicability  discussed concussions, cleared for sports  .

## 2022-02-15 NOTE — NURSING NOTE
Generalized Anxiety Disorder Screening Entered On:  12/10/2021 8:58 AM CST    Performed On:  10/11/2021 8:58 AM CDT by Noemy Rivera               PORFIRIO-7   PORFIRIO Nervous, Anxious On Edge :   Nearly every day   PORFIRIO Control Worrying B :   Nearly every day   PORFIRIO Worrying Too Much :   More than half the days   PORFIRIO Trouble Relaxing :   More than half the days   PORFIRIO Restless :   Nearly every day   PORFIRIO Easily Annoyed/Irritable :   Not at all   PORFIRIO Afraid :   More than half the days   PORFIRIO Total Screening Score :   15    Noemy Rivera - 12/10/2021 8:58 AM CST

## 2022-02-15 NOTE — NURSING NOTE
Comprehensive Intake Entered On:  3/7/2019 3:44 PM CST    Performed On:  3/7/2019 3:35 PM CST by Patricia Mcmahon CMA               Summary   Chief Complaint :   Sports px - tennis.   Weight Measured :   192 lb(Converted to: 192 lb 0 oz, 87.09 kg)    Height Measured :   71 in(Converted to: 5 ft 11 in, 180.34 cm)    Body Mass Index :   26.78 kg/m2   Body Surface Area :   2.09 m2   Systolic Blood Pressure :   128 mmHg   Diastolic Blood Pressure :   70 mmHg   Mean Arterial Pressure :   89 mmHg   Peripheral Pulse Rate :   76 bpm   BP Site :   Right arm   Pulse Site :   Radial artery   BP Method :   Manual   HR Method :   Manual   Temperature Tympanic :   97.5 DegF(Converted to: 36.4 DegC)  (LOW)    Patricia Mcmahon CMA - 3/7/2019 3:35 PM CST   Health Status   Allergies Verified? :   Yes   Medication History Verified? :   Yes   Pre-Visit Planning Status :   Completed   Tobacco Use? :   Never smoker   Patricia Mcmahon CMA - 3/7/2019 3:35 PM CST   Meds / Allergies   (As Of: 3/7/2019 3:44:44 PM CST)   Allergies (Active)   No Known Medication Allergies  Estimated Onset Date:   Unspecified ; Created By:   HILL Manzano CMA, Jessica; Reaction Status:   Active ; Category:   Drug ; Substance:   No Known Medication Allergies ; Type:   Allergy ; Updated By:   HILL Manzano CMA, Jessica; Reviewed Date:   3/18/2018 10:47 AM CDT        Medication List   (As Of: 3/7/2019 3:44:44 PM CST)   No Known Home Medications     Patricia Mcmahon CMA - 3/7/2019 3:43:14 PM           Vision Testing POC   Corrective Lenses :   Glasses   Eye, Left with Correction Visual Acuity :   20/25   Eye, Right with Correction Visual Acuity :   20/20   Eye, Bilat w/Correction Visual Acuity :   20/20   Patricia Mcmahon CMA - 3/7/2019 3:35 PM CST